# Patient Record
Sex: MALE | Race: WHITE | NOT HISPANIC OR LATINO | Employment: FULL TIME | URBAN - METROPOLITAN AREA
[De-identification: names, ages, dates, MRNs, and addresses within clinical notes are randomized per-mention and may not be internally consistent; named-entity substitution may affect disease eponyms.]

---

## 2018-07-18 ENCOUNTER — OFFICE VISIT (OUTPATIENT)
Dept: URGENT CARE | Facility: URGENT CARE | Age: 28
End: 2018-07-18
Payer: COMMERCIAL

## 2018-07-18 ENCOUNTER — RADIANT APPOINTMENT (OUTPATIENT)
Dept: GENERAL RADIOLOGY | Facility: CLINIC | Age: 28
End: 2018-07-18
Attending: PHYSICIAN ASSISTANT
Payer: COMMERCIAL

## 2018-07-18 VITALS
TEMPERATURE: 96.5 F | DIASTOLIC BLOOD PRESSURE: 93 MMHG | WEIGHT: 202 LBS | HEIGHT: 72 IN | BODY MASS INDEX: 27.36 KG/M2 | OXYGEN SATURATION: 97 % | SYSTOLIC BLOOD PRESSURE: 144 MMHG | HEART RATE: 102 BPM

## 2018-07-18 DIAGNOSIS — S56.419A RUPTURE OF EXTENSOR TENDON OF FINGER: Primary | ICD-10-CM

## 2018-07-18 PROCEDURE — 99213 OFFICE O/P EST LOW 20 MIN: CPT | Performed by: PHYSICIAN ASSISTANT

## 2018-07-18 PROCEDURE — 73140 X-RAY EXAM OF FINGER(S): CPT | Mod: LT

## 2018-07-18 NOTE — LETTER
July 18, 2018      Ion Dumont  [address] [place] [address]        To Whom It May Concern:    Ion Dumont  was seen on 07/18/18.  Please excuse his absence from work tomorrow, 7/19/19, due to injury. Further return to work restrictions to be provided by orthopedics.        Sincerely,        Erika Teague PA-C

## 2018-07-18 NOTE — MR AVS SNAPSHOT
After Visit Summary   7/18/2018    Ion Dumont    MRN: [id number]           Patient Information     Date Of Birth          1990        Visit Information        Provider Department      7/18/2018 8:30 PM Erika Teague PA-C [facility]        Today's Diagnoses     Rupture of extensor tendon of finger    -  1      Care Instructions    Your xrays showed no fractures or dislocation.    You seem to have ruptured the extensor tendon of your finger.    Please keep finger in splint at all times.    Follow up with orthopedics tomorrow- referral provided.            Follow-ups after your visit        Additional Services     ORTHO  REFERRAL       [facility] is referring you to the Orthopedic  Services at [facility].       The  Representative will assist you in the coordination of your Orthopedic and Musculoskeletal Care as prescribed by your physician.    The  Representative will call you within 1 business day to help schedule your appointment, or you may contact the  Representative at:    All areas ~ [phone]     Type of Referral : HAND    Needs to be seen tomorrow    Timeframe requested: 1 - 2 days    Coverage of these services is subject to the terms and limitations of your health insurance plan.  Please call member services at your health plan with any benefit or coverage questions.      If X-rays, CT or MRI's have been performed, please contact the facility where they were done to arrange for , prior to your scheduled appointment.  Please bring this referral request to your appointment and present it to your specialist.                  Follow-up notes from your care team     Return in about 1 day (around 7/19/2018) for ortho.      Who to contact     If you have questions or need follow up information about today's clinic visit or your schedule please contact [facility] directly at [phone].  Normal or non-critical lab and imaging results will be communicated to you by MyChart, letter or phone within 4 business days after the clinic has received the results. If you do not hear from us within 7 days, please contact the clinic through MyChart or phone. If you have a critical or abnormal lab result, we will notify you by phone as soon as possible.  Submit refill requests through [employer] or call your pharmacy and they will forward the refill request to us. Please allow 3 business days for your refill to be completed.          Additional Information About Your Visit        Care EveryWhere ID     This is your Care EveryWhere ID. This could be used by other organizations to access your [place] medical records  [id number]        Your Vitals Were     Pulse Temperature Height Pulse Oximetry BMI (Body Mass Index)       102 96.5  F (35.8  C) (Tympanic) 6' (1.829 m) 97% 27.4 kg/m2        Blood Pressure from Last 3 Encounters:   07/18/18 (!) 144/93    Weight from Last 3 Encounters:   07/18/18 202 lb (91.6 kg)              We Performed the Following     ORTHO  REFERRAL     XR Finger Left G/E 2 Views        Primary Care Provider Fax #    Physician No Ref-Primary [phone]       No address on file        Equal Access to Services     DYANA CHAVEZ : Hadii brandy ku hadasho Soomaali, waaxda luqadaha, qaybta kaalmada adeegyada, waxay alannain constantine mccray . So [facility] [phone].    ATENCIÓN: Si habla español, tiene a moran disposición servicios gratuitos de asistencia lingüística. Llame al [phone].    We comply with applicable federal civil rights laws and [place] laws. We do not discriminate on the basis of race, color, national origin, age, disability, sex, sexual orientation, or gender identity.            Thank you!     Thank you for choosing [facility]  for your care. Our goal is always to provide you with excellent  care. Hearing back from our patients is one way we can continue to improve our services. Please take a few minutes to complete the written survey that you may receive in the mail after your visit with us. Thank you!             Your Updated Medication List - Protect others around you: Learn how to safely use, store and throw away your medicines at www.disposemymeds.org.          This list is accurate as of 7/18/18  8:59 PM.  Always use your most recent med list.                   Brand Name Dispense Instructions for use Diagnosis    UNKNOWN TO PATIENT      Topical meds for  Skin condition.

## 2018-07-19 ENCOUNTER — TELEPHONE (OUTPATIENT)
Dept: ORTHOPEDICS | Facility: CLINIC | Age: 28
End: 2018-07-19

## 2018-07-19 ENCOUNTER — THERAPY VISIT (OUTPATIENT)
Dept: OCCUPATIONAL THERAPY | Facility: CLINIC | Age: 28
End: 2018-07-19
Payer: COMMERCIAL

## 2018-07-19 ENCOUNTER — OFFICE VISIT (OUTPATIENT)
Dept: ORTHOPEDICS | Facility: CLINIC | Age: 28
End: 2018-07-19
Payer: COMMERCIAL

## 2018-07-19 VITALS — HEIGHT: 72 IN | BODY MASS INDEX: 27.35 KG/M2 | WEIGHT: 201.94 LBS

## 2018-07-19 DIAGNOSIS — M20.012 MALLET DEFORMITY OF LEFT MIDDLE FINGER: Primary | ICD-10-CM

## 2018-07-19 DIAGNOSIS — M20.012 MALLET DEFORMITY OF LEFT MIDDLE FINGER: ICD-10-CM

## 2018-07-19 PROCEDURE — 97165 OT EVAL LOW COMPLEX 30 MIN: CPT | Mod: GO | Performed by: OCCUPATIONAL THERAPIST

## 2018-07-19 PROCEDURE — 97760 ORTHOTIC MGMT&TRAING 1ST ENC: CPT | Mod: GO | Performed by: OCCUPATIONAL THERAPIST

## 2018-07-19 NOTE — NURSING NOTE
"Reason For Visit:   Chief Complaint   Patient presents with     Consult     Left hand middle finger ruptured tendon.       Primary MD: No Ref-Primary, Physician  Ref. MD: Self    Age: 28 year old    ?  No      Ht 1.829 m (6' 0.01\")  Wt 91.6 kg (201 lb 15.1 oz)  BMI 27.38 kg/m2      Pain Assessment  Patient Currently in Pain: Denies               QuickDASH Assessment  No flowsheet data found.       Current Outpatient Prescriptions   Medication Sig Dispense Refill     UNKNOWN TO PATIENT Topical meds for  Skin condition.         No Known Allergies    Ramandeep Briseno, ATC    "

## 2018-07-19 NOTE — LETTER
Date:July 23, 2018      Patient was self referred, no letter generated. Do not send.        [facility] Health Information

## 2018-07-19 NOTE — LETTER
"7/19/2018       RE: Ion Dumont  [address]  [place] [address]     Dear Colleague,    Thank you for referring your patient, Ion Dumont, to the [facility] at [facility]. Please see a copy of my visit note below.    REFERRING PROVIDER: Erika Teague PA-C.    REASON FOR CONSULTATION: Left long finger injury.    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  Patient reports sustaining the injury last evening while playing kickball when he received a ball that jammed into his left long finger.  Following this, he did not experience significant pain, but was unable to extend the left long finger at his DIP joint.  He denies any numbness or tingling.  He has a significant history of orthopedic injuries all around his body.    MEDS: None.    ALLERGIES: None.    PMH: None.    PSH: R shoulder surgery, R femur ORIF. R scaphoid ORIF with bone graft.    SH:   Social History   Substance Use Topics     Smoking status: Never Smoker     Smokeless tobacco: Never Used     Alcohol use Not on file   Works as a pharmacist.    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    ROS: Denies chest pain, shortness of breath, MI, CVA, diabetes, DVT, PE, and bleeding disorders.    PHYSICAL EXAMINATION: Ht 1.829 m (6' 0.01\")  Wt 91.6 kg (201 lb 15.1 oz)  BMI 27.38 kg/m2  General: In no acute distress.  On examination of the left long finger, and aluminum/foam splint is intact.  This was removed to reveal a left long finger with no swelling and no open injuries.  The DIP joint sat in a flexed position.  Sensation to light touch was intact in both radial and ulnar borders of this finger.  There was good cap refill at the tip of the finger.  Patient was able to flex both PIP and DIP joints of the finger.  He was also able to extend the PIP joint.  However, he was unable to " extend the DIP joint actively.  Passively, the DIP joint could be extended to hyperextension.    IMAGING: X-ray of the left long finger revealed no bony injury.    ASSESSMENT: Left long finger closed mallet injury with no bony fragment.    PLAN: We discussed our options today.  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  Both options include uninterrupted 8 weeks of DIP joint immobilization in slight hyperextension.  The first option is to perform this with an external splint fabricated by hand therapy.  I explained to him that this may become a nuisance when he has to wash his hands.  The second option is to perform immobilization with 2 internal K wires spanning the DIP joint, which would then be removed surgically in 8 weeks.  This would have to be placed as an outpatient in the ambulatory surgery center.  I explained the risks to include skin damage, bleeding, infection, injury to surrounding structures, persistent extensor lag, and need for revision.  Patient carefully weight is options and elected to undergo external splinting.  I ordered a hand therapy referral for this.  I will see the patient back in 8 weeks.    Total time spent with patient was 30 min of which greater than 50% was in counseling.    Again, thank you for allowing me to participate in the care of your patient.      Sincerely,    Darwin Bland MD

## 2018-07-19 NOTE — TELEPHONE ENCOUNTER
[facility] Call Center    Phone Message    May a detailed message be left on voicemail: yes    Reason for Call: Other: Pt requesting call back to discuss an appt w/ a hand surgeon for today. Pt was seen at a  urgent care last night for a ruptured tendon in his middle finger     Action Taken: Message routed to:  [facility] ortho clinic

## 2018-07-19 NOTE — PROGRESS NOTES
"SUBJECTIVE:   Ion Dumont is a 28 year old male presenting for evaluation of   Chief Complaint   Patient presents with     Urgent Care     Pt in clinic to have eval for left hand middle finger injury.     Finger     Patient was playing kickball this evening. He reached out to try to catch the ball and the ball instead hit the tip of his left middle finger. He experienced immediate pain and thinks his finger is dislocated at the PIP joint. He is unable to bend at the PIP joint. He admits that the whole finger \"feels numb.\" No prior finger injuries or surgeries.    ROS  See HPI otherwise negative    PMH:  No past medical history on file.  There are no active problems to display for this patient.        Current medications:  Current Outpatient Prescriptions   Medication Sig Dispense Refill     UNKNOWN TO PATIENT Topical meds for  Skin condition.         Surgical History:  No past surgical history on file.    Family history:  No family history on file.      Social History:  Social History   Substance Use Topics     Smoking status: Never Smoker     Smokeless tobacco: Never Used     Alcohol use Not on file           OBJECTIVE  BP (!) 144/93  Pulse 102  Temp 96.5  F (35.8  C) (Tympanic)  Ht 6' (1.829 m)  Wt 202 lb (91.6 kg)  SpO2 97%  BMI 27.4 kg/m2    Physical Exam  General: well-appearing, no acute distress.   Muscloloskeletal: there is gross deformity of the left middle finger at the DIP joint- appears like a mallet finger. Patient unable to actively extend at DIP joint. Flexion at DIP preserved. Extension at PIP and MCP preserved.        Labs:  No results found for this or any previous visit (from the past 24 hour(s)).    X-Ray was done, my findings are: no fracture or dislocation    ASSESSMENT/PLAN:      ICD-10-CM    1. Rupture of extensor tendon of finger S56.419A XR Finger Left G/E 2 Views     ORTHO  REFERRAL      Patient with obvious deformity of the left middle finger. Ddx includes distal or " middle phalanx dislocation, extensor tendon rupture.   Upon arrival, I examined him and instilled a digital block to the middle finger with Lidocaine 2% without epinephrine.  Good anesthesia was achieved.    Xrays ordered to evaluate for fracture/dislocation. None were seen- I did not even appreciate a tiny avulsion fx to suggest avulsion of extensor tendonr. Radiology read is pending.    Regardless, clinically he has a mallet finger. We will treat as such. Placed patient in finger splint with DIP, PIP immobilized in extension.    Follow up with ortho tomorrow. STAT referral placed.      Patient Instructions   Your xrays showed no fractures or dislocation.    You seem to have ruptured the extensor tendon of your finger.    Please keep finger in splint at all times.    Follow up with orthopedics tomorrow- referral provided.              Erika Teague PA-C  07/18/18 9:15 PM

## 2018-07-19 NOTE — PROGRESS NOTES
"REFERRING PROVIDER: Erika Teague PA-C.    REASON FOR CONSULTATION: Left long finger injury.    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  Patient reports sustaining the injury last evening while playing kickball when he received a ball that jammed into his left long finger.  Following this, he did not experience significant pain, but was unable to extend the left long finger at his DIP joint.  He denies any numbness or tingling.  He has a significant history of orthopedic injuries all around his body.    MEDS: None.    ALLERGIES: None.    PMH: None.    PSH: R shoulder surgery, R femur ORIF. R scaphoid ORIF with bone graft.    SH:   Social History   Substance Use Topics     Smoking status: Never Smoker     Smokeless tobacco: Never Used     Alcohol use Not on file   Works as a pharmacist.    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    ROS: Denies chest pain, shortness of breath, MI, CVA, diabetes, DVT, PE, and bleeding disorders.    PHYSICAL EXAMINATION: Ht 1.829 m (6' 0.01\")  Wt 91.6 kg (201 lb 15.1 oz)  BMI 27.38 kg/m2  General: In no acute distress.  On examination of the left long finger, and aluminum/foam splint is intact.  This was removed to reveal a left long finger with no swelling and no open injuries.  The DIP joint sat in a flexed position.  Sensation to light touch was intact in both radial and ulnar borders of this finger.  There was good cap refill at the tip of the finger.  Patient was able to flex both PIP and DIP joints of the finger.  He was also able to extend the PIP joint.  However, he was unable to extend the DIP joint actively.  Passively, the DIP joint could be extended to hyperextension.    IMAGING: X-ray of the left long finger revealed no bony injury.    ASSESSMENT: Left long finger closed mallet injury with no bony fragment.    PLAN: We discussed our options today.  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  Both options include uninterrupted 8 weeks of DIP joint immobilization in slight hyperextension.  The first option is to perform this with an external splint fabricated by hand therapy.  I explained to him that this may become a nuisance when he has to wash his hands.  The second option is to perform immobilization with 2 internal K wires spanning the DIP joint, which would then be removed surgically in 8 weeks.  This would have to be placed as an outpatient in the ambulatory surgery center.  I explained the risks to include skin damage, bleeding, infection, injury to surrounding structures, persistent extensor lag, and need for revision.  Patient carefully weight is options and elected to undergo external splinting.  I ordered a hand therapy referral for this.  I will see the patient back in 8 weeks.    Total time spent with patient was 30 min of which greater than 50% was in counseling.

## 2018-07-19 NOTE — MR AVS SNAPSHOT
"              After Visit Summary   7/19/2018    Ion Dumont    MRN: [id number]           Patient Information     Date Of Birth          1990        Visit Information        Provider Department      7/19/2018 6:00 PM [facility] Bland [facility]        Today's Diagnoses     Mallet deformity of left middle finger           Follow-ups after your visit        Additional Services     HAND THERAPY Occupational Therapy or Physical Therapy       Hand Therapy Referral for left long finger mallet finger splint x 8 weeks.                  Follow-up notes from your care team     Return in about 8 weeks (around 9/13/2018).      Your next 10 appointments already scheduled     Jul 27, 2018  8:00 AM CDT   DEA Hand with Ella Russo OT   [facility] ([facility] [place])    [address] [place] [address] [id number] [phone]            Sep 13, 2018  4:15 PM CDT   (Arrive by 4:00 PM)   RETURN HAND with Darwin Bland MD   [facility] ([facility] [place])    [address] [place] [address] [id number] [phone]              Who to contact     Please call your clinic at [phone] to:    Ask questions about your health    Make or cancel appointments    Discuss your medicines    Learn about your test results    Speak to your doctor            Additional Information About Your Visit        Care EveryWhere ID     This is your Care EveryWhere ID. This could be used by other organizations to access your [place] medical records  [id number]        Your Vitals Were     Height BMI (Body Mass Index)                6' 0.01\" 27.38 kg/m2           Blood Pressure from Last 3 Encounters:   07/18/18 (!) 144/93    Weight from Last 3 Encounters:   07/19/18 201 lb 15.1 oz   07/18/18 202 lb              We Performed the Following     HAND THERAPY Occupational Therapy or Physical Therapy        Primary Care Provider Fax #    " Physician No Ref-Primary [phone]       No address on file        Equal Access to Services     DYANA MARDESIREE : Hadmaster aad ku hadchandukeren Cameliacristian, josueda calikrystinha, bernardclaudine choudanellenishi augustsarinishi, kalli moulton sawyertoshia tomasjudysimon watt. So [facility] [phone].    ATENCIÓN: Si habla español, tiene a moran disposición servicios gratuitos de asistencia lingüística. Llame al [phone].    We comply with applicable federal civil rights laws and [place] laws. We do not discriminate on the basis of race, color, national origin, age, disability, sex, sexual orientation, or gender identity.            Thank you!     Thank you for choosing [facility]  for your care. Our goal is always to provide you with excellent care. Hearing back from our patients is one way we can continue to improve our services. Please take a few minutes to complete the written survey that you may receive in the mail after your visit with us. Thank you!             Your Updated Medication List - Protect others around you: Learn how to safely use, store and throw away your medicines at www.disposemymeds.org.          This list is accurate as of 7/19/18 11:59 PM.  Always use your most recent med list.                   Brand Name Dispense Instructions for use Diagnosis    UNKNOWN TO PATIENT      Topical meds for  Skin condition.

## 2018-07-19 NOTE — PROGRESS NOTES
Hand Therapy Initial Evaluation    Current Date:  7/19/2018    Diagnosis: L long finger mallet injury  DOI: 07/18/18   Procedure:  none  Post:  1d    Precautions: NA    Subjective:  Ion Dmuont is a 28 year old left hand dominant male.    Patient reports symptoms of stiffness/loss of motion, weakness/loss of strength and edema of the left long finger which occurred due to playing kickball. Since onset symptoms are Unchanged  Special tests:  x-ray.  Previous treatment: splint in ED.    General health as reported by patient is excellent.  Pertinent medical history includes:None  Medical allergies:none.  Surgical history: none.  Medication history: None.    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  Job Tasks: Computer Work, Lifting, Carrying, Prolonged Sitting, Prolonged Standing, Pushing, Pulling, Repetitive Tasks  Prior functional level:  no limitations  Barriers include:none  Mobility: No difficulty  Transportation: drives  Leisure activities/hobbies: sports    Objective:  Pain Level Report  VAS(0-10) 7/19/2018   At Rest: 0/10   With Use: 0/10     Finger Edema  Circumference:  (Measured in cm)   7/19/2018   Location: Long finger   P1 6.5   PIP 7.0   P2 5.9   DIP 5.5     Sensation: WNL throughout all nerve distributions; per patient report    ROM:  Pain Report:  - none    + mild    ++ moderate    +++ severe     Long Finger 7/19/2018   AROM(PROM) left   MCP 0/98   PIP 0/90   DIP -40/81   CASH 229     Strength:  cont    Assessment:  Patient presents with symptoms consistent with diagnosis of mallet finger,  with conservative intervention.     Patient's limitations or Problem List includes:  Decreased ROM/motion, Increased edema and Weakness of the left long finger which interferes with the patient's ability to perform Self Care Tasks (dressing, eating, bathing, hygiene/toileting), Work Tasks, Sleep Patterns, Recreational Activities,  Household Chores and Driving  as compared to previous level of function.    Rehab Potential:  Excellent - Return to full activity, no limitations    Patient will benefit from skilled Occupational Therapy to increase stability of DIP joint and decrease edema to return to previous activity level and resume normal daily tasks and to reach their rehab potential.    Barriers to Learning:  No barrier    Communication Issues:  Patient appears to be able to clearly communicate and understand verbal and written communication and follow directions correctly.    Chart Review: Simple history review with patient    Identified Performance Deficits: bathing/showering, toileting, dressing, feeding, hygiene and grooming, driving and community mobility, health management and maintenance, home establishment and management, meal preparation and cleanup, work and leisure activities    Assessment of Occupational Performance:  5 or more Performance Deficits    Clinical Decision Making (Complexity): Low complexity    Treatment Explanation:  The following has been discussed with the patient:  RX ordered/plan of care  Anticipated outcomes  Possible risks and side effects    Plan:  Frequency:  1 X week, once daily  Duration:  for 2 weeks tapering to 2 X a month over 8 weeks    Treatment Plan:   Orthotic Fabrication:  Static orthosis  Discharge Plan:  Achieve all LTG.  Independent in home treatment program.  Reach maximal therapeutic benefit.    Home Exercise Program:  Mallet Finger orthosis, full time, only removed for hygiene and tip kept straight during that time    Next Visit:  Orthosis check  Edema mgmt

## 2018-07-19 NOTE — MR AVS SNAPSHOT
After Visit Summary   7/19/2018    Ion Dumont    MRN: [id number]           Patient Information     Date Of Birth          1990        Visit Information        Provider Department      7/19/2018 5:30 PM Cinthia Trevino OT [facility]        Today's Diagnoses     Mallet deformity of left middle finger    -  1       Follow-ups after your visit        Your next 10 appointments already scheduled     Jul 27, 2018  8:00 AM CDT   DEA Hand with Ella Russo OT   [facility] ([facility])    [address] [place] [id number] [phone]            Sep 13, 2018  4:15 PM CDT   (Arrive by 4:00 PM)   RETURN HAND with Darwin Bland MD   [facility] ([facility])    [address] [place] [address] [id number] [phone]              Who to contact     If you have questions or need follow up information about today's clinic visit or your schedule please contact [facility] HAND THERAP[facility] directly at [phone].  Normal or non-critical lab and imaging results will be communicated to you by MyChart, letter or phone within 4 business days after the clinic has received the results. If you do not hear from us within 7 days, please contact the clinic through MyChart or phone. If you have a critical or abnormal lab result, we will notify you by phone as soon as possible.  Submit refill requests through [employer] or call your pharmacy and they will forward the refill request to us. Please allow 3 business days for your refill to be completed.          Additional Information About Your Visit        Care EveryWhere ID     This is your Care EveryWhere ID. This could be used by other organizations to access your [place] medical records  [id number]         Blood Pressure from Last 3 Encounters:   07/18/18 (!) 144/93    Weight from Last 3 Encounters:   07/19/18 91.6 kg (201 lb 15.1 oz)    07/18/18 91.6 kg (202 lb)              We Performed the Following     HC OT EVAL, LOW COMPLEXITY     ORTHOTIC MGMT AND TRAINING, EACH 15 MIN        Primary Care Provider Fax #    Physician No Ref-Primary [phone]       No address on file        Equal Access to Services     DYANA KATHY : Johnny brandy whittaker javi Socristian, wafalgunida luqadaha, qaarturta kaalmada skinnymata, waxhazel kenney sawyertoshia babbelin halesimon mccray . So [facility] [phone].    ATENCIÓN: Si habla español, tiene a moran disposición servicios gratuitos de asistencia lingüística. Llame al [phone].    We comply with applicable federal civil rights laws and [place] laws. We do not discriminate on the basis of race, color, national origin, age, disability, sex, sexual orientation, or gender identity.            Thank you!     Thank you for choosing [facility] HAND THERAP[facility]  for your care. Our goal is always to provide you with excellent care. Hearing back from our patients is one way we can continue to improve our services. Please take a few minutes to complete the written survey that you may receive in the mail after your visit with us. Thank you!             Your Updated Medication List - Protect others around you: Learn how to safely use, store and throw away your medicines at www.disposemymeds.org.          This list is accurate as of 7/19/18  9:01 PM.  Always use your most recent med list.                   Brand Name Dispense Instructions for use Diagnosis    UNKNOWN TO PATIENT      Topical meds for  Skin condition.

## 2018-07-19 NOTE — PATIENT INSTRUCTIONS
Your xrays showed no fractures or dislocation.    You seem to have ruptured the extensor tendon of your finger.    Please keep finger in splint at all times.    Follow up with orthopedics tomorrow- referral provided.

## 2018-07-27 ENCOUNTER — THERAPY VISIT (OUTPATIENT)
Dept: OCCUPATIONAL THERAPY | Facility: CLINIC | Age: 28
End: 2018-07-27
Payer: COMMERCIAL

## 2018-07-27 DIAGNOSIS — M20.012 MALLET DEFORMITY OF LEFT MIDDLE FINGER: ICD-10-CM

## 2018-07-27 PROCEDURE — 97535 SELF CARE MNGMENT TRAINING: CPT | Mod: GO | Performed by: OCCUPATIONAL THERAPIST

## 2018-07-27 PROCEDURE — 97763 ORTHC/PROSTC MGMT SBSQ ENC: CPT | Mod: GO | Performed by: OCCUPATIONAL THERAPIST

## 2018-08-08 ENCOUNTER — THERAPY VISIT (OUTPATIENT)
Dept: OCCUPATIONAL THERAPY | Facility: CLINIC | Age: 28
End: 2018-08-08
Payer: COMMERCIAL

## 2018-08-08 DIAGNOSIS — M20.012 MALLET DEFORMITY OF LEFT MIDDLE FINGER: ICD-10-CM

## 2018-08-08 PROCEDURE — 97535 SELF CARE MNGMENT TRAINING: CPT | Mod: GO | Performed by: OCCUPATIONAL THERAPIST

## 2018-09-13 ENCOUNTER — OFFICE VISIT (OUTPATIENT)
Dept: ORTHOPEDICS | Facility: CLINIC | Age: 28
End: 2018-09-13
Payer: COMMERCIAL

## 2018-09-13 ENCOUNTER — APPOINTMENT (OUTPATIENT)
Dept: OCCUPATIONAL THERAPY | Facility: CLINIC | Age: 28
End: 2018-09-13
Payer: COMMERCIAL

## 2018-09-13 DIAGNOSIS — Z53.9 ERRONEOUS ENCOUNTER--DISREGARD: Primary | ICD-10-CM

## 2018-09-13 NOTE — MR AVS SNAPSHOT
After Visit Summary   9/13/2018    Ion Dumont    MRN: [id number]           Patient Information     Date Of Birth          1990        Visit Information        Provider Department      9/13/2018 4:15 PM Darwin Bland MD [facility]        Today's Diagnoses     ERRONEOUS ENCOUNTER--DISREGARD    -  1       Follow-ups after your visit        Who to contact     Please call your clinic at [phone] to:    Ask questions about your health    Make or cancel appointments    Discuss your medicines    Learn about your test results    Speak to your doctor            Additional Information About Your Visit        Care EveryWhere ID     This is your Care EveryWhere ID. This could be used by other organizations to access your [place] medical records  [id number]         Blood Pressure from Last 3 Encounters:   07/18/18 (!) 144/93    Weight from Last 3 Encounters:   07/19/18 91.6 kg (201 lb 15.1 oz)   07/18/18 91.6 kg (202 lb)              Today, you had the following     No orders found for display       Primary Care Provider Fax #    Physician No Ref-Primary [phone]       No address on file        Equal Access to Services     [facility]: Hadii brandy ku hadasho Soomaali, waaxda luqadaha, qaybta kaalmada adeegyada, kalli mccray . So [facility] [phone].    ATENCIÓN: Si habla español, tiene a moran disposición servicios gratuitos de asistencia lingüística. Llame al [phone].    We comply with applicable federal civil rights laws and [place] laws. We do not discriminate on the basis of race, color, national origin, age, disability, sex, sexual orientation, or gender identity.            Thank you!     Thank you for choosing [facility]  for your care. Our goal is always to provide you with excellent care. Hearing back from our patients is one way we can continue to improve our services. Please take a few minutes to complete the written  survey that you may receive in the mail after your visit with us. Thank you!             Your Updated Medication List - Protect others around you: Learn how to safely use, store and throw away your medicines at www.disposemymeds.org.          This list is accurate as of 9/13/18  5:48 PM.  Always use your most recent med list.                   Brand Name Dispense Instructions for use Diagnosis    UNKNOWN TO PATIENT      Topical meds for  Skin condition.

## 2018-09-13 NOTE — NURSING NOTE
Reason For Visit:   Chief Complaint   Patient presents with     RECHECK     8 week follow up left hand middle finger, mallet finger.       Primary MD: No Ref-Primary, Physician  Ref. MD: Self    Age: 28 year old    ?  No      There were no vitals taken for this visit.      Pain Assessment  Patient Currently in Pain: Denies               QuickDASH Assessment  No flowsheet data found.       Current Outpatient Prescriptions   Medication Sig Dispense Refill     UNKNOWN TO PATIENT Topical meds for  Skin condition.         No Known Allergies    Ramandeep Briseno, ATC

## 2018-09-20 ENCOUNTER — TELEPHONE (OUTPATIENT)
Dept: ORTHOPEDICS | Facility: CLINIC | Age: 28
End: 2018-09-20

## 2018-09-20 ENCOUNTER — THERAPY VISIT (OUTPATIENT)
Dept: OCCUPATIONAL THERAPY | Facility: CLINIC | Age: 28
End: 2018-09-20
Payer: COMMERCIAL

## 2018-09-20 ENCOUNTER — OFFICE VISIT (OUTPATIENT)
Dept: ORTHOPEDICS | Facility: CLINIC | Age: 28
End: 2018-09-20
Payer: COMMERCIAL

## 2018-09-20 DIAGNOSIS — M20.012 MALLET DEFORMITY OF LEFT MIDDLE FINGER: Primary | ICD-10-CM

## 2018-09-20 DIAGNOSIS — M20.012 MALLET DEFORMITY OF LEFT MIDDLE FINGER: ICD-10-CM

## 2018-09-20 PROCEDURE — 97110 THERAPEUTIC EXERCISES: CPT | Mod: GO | Performed by: OCCUPATIONAL THERAPIST

## 2018-09-20 NOTE — TELEPHONE ENCOUNTER
Patient was called back and he was offered and accepted an earlier appointment with Dr. Bland today 9/20/2018 at 3:00pm

## 2018-09-20 NOTE — MR AVS SNAPSHOT
After Visit Summary   9/20/2018    Ion Dumont    MRN: [id number]           Patient Information     Date Of Birth          1990        Visit Information        Provider Department      9/20/2018 3:00 PM [facility]        Today's Diagnoses     Mallet deformity of left middle finger    -  1       Follow-ups after your visit        Additional Services     HAND THERAPY Occupational Therapy or Physical Therapy       Hand Therapy Referral for active and passive ROM exercises following 9 weeks of splinting for mallet finger (L LF). Please progress to strengthening 3 - 4 weeks later.                  Follow-up notes from your care team     Return in about 4 weeks (around 10/18/2018).      Your next 10 appointments already scheduled     Sep 21, 2018 10:00 AM CDT   DEA Hand with Ella Russo OT   [facility] ([facility])    [address] [place] [id number] [phone]            Oct 16, 2018  9:00 AM CDT   (Arrive by 8:45 AM)   RETURN HAND with Darwin Bland MD   [facility] ([facility])    [address] [place] [id number] [phone]              Future tests that were ordered for you today     Open Future Orders        Priority Expected Expires Ordered    HAND THERAPY Occupational Therapy or Physical Therapy Routine  9/20/2019 9/20/2018            Who to contact     Please call your clinic at [phone] to:    Ask questions about your health    Make or cancel appointments    Discuss your medicines    Learn about your test results    Speak to your doctor            Additional Information About Your Visit        Care EveryWhere ID     This is your Care EveryWhere ID. This could be used by other organizations to access your [place] medical records  [id number]         Blood Pressure from Last 3 Encounters:   07/18/18 (!) 144/93     Weight from Last 3 Encounters:   07/19/18 201 lb 15.1 oz   07/18/18 202 lb               Primary Care Provider Fax #    Physician No Ref-Primary [phone]       No address on file        Equal Access to Services     DYANA CHAVEZ : Johnny brandy whittaker shenao Socristian, wafalgunida luqadaha, bernardta kaalmada wilmer, kalli mcfaddenyojana watt. So [facility] [phone].    ATENCIÓN: Si habla español, tiene a moran disposición servicios gratuitos de asistencia lingüística. Llame al [phone].    We comply with applicable federal civil rights laws and [place] laws. We do not discriminate on the basis of race, color, national origin, age, disability, sex, sexual orientation, or gender identity.            Thank you!     Thank you for choosing [facility]  for your care. Our goal is always to provide you with excellent care. Hearing back from our patients is one way we can continue to improve our services. Please take a few minutes to complete the written survey that you may receive in the mail after your visit with us. Thank you!             Your Updated Medication List - Protect others around you: Learn how to safely use, store and throw away your medicines at www.disposemymeds.org.          This list is accurate as of 9/20/18  3:31 PM.  Always use your most recent med list.                   Brand Name Dispense Instructions for use Diagnosis    UNKNOWN TO PATIENT      Topical meds for  Skin condition.

## 2018-09-20 NOTE — NURSING NOTE
Reason For Visit:   Chief Complaint   Patient presents with     RECHECK     8 week follow up left hand middle finger tendon reuptre, mallet finger       Primary MD: No Ref-Primary, Physician  Ref. MD: Self    Age: 28 year old    ?  No      There were no vitals taken for this visit.      Pain Assessment  Patient Currently in Pain: Denies               QuickDASH Assessment  No flowsheet data found.       Current Outpatient Prescriptions   Medication Sig Dispense Refill     UNKNOWN TO PATIENT Topical meds for  Skin condition.         No Known Allergies    Ramandeep Briseno, ATC

## 2018-09-20 NOTE — TELEPHONE ENCOUNTER
[facility] Call Center    Phone Message    May a detailed message be left on voicemail: yes    Reason for Call: Other: Pt wants to speak to a nurse about upcming appt today.      Action Taken: Message routed to:  [facility] Orthopedics

## 2018-09-20 NOTE — LETTER
Date:September 21, 2018      Patient was self referred, no letter generated. Do not send.        [facility] Health Information

## 2018-09-20 NOTE — PROGRESS NOTES
Patient returns for a follow-up visit regarding left long finger mallet injury.    INTERVAL HISTORY: Patient has been compliant with splinting for the past 9 weeks.    PHYSICAL EXAMINATION:  Gen.: No acute distress.  On exam of the left long finger, splint was removed. This revealed a fully extended DIP joint. Skin is somewhat erythematous and slightly macerated but there are no wounds. Patient is able to flicker actively flexion of the DIP joint in full extension of the DIP joint as well. Sensation to light touch is intact in the tip.    ASSESSMENT: 9 weeks splinting of left long finger DIP joint for mallet injury.    PLAN: Patient may come out of splint now. He is to transition to nighttime splinting only. He is to see hand therapy for active range of motion and passive range of motion. He is to wait on strengthening and strenuous activities at this point. I will see him back in 4 weeks.    Total time spent with patient was 15 min of which greater than 50% was in counseling.    Answers for HPI/ROS submitted by the patient on 9/20/2018   PHQ-2 Score: 0

## 2018-09-20 NOTE — TELEPHONE ENCOUNTER
Called patient to reschedule appointment due to Provider canceling clinic after 5:15 PM. Offered next available after speaking to nurse and inform patient that he can be schedule next week and if anything the clinic will give him a call if he needs to be seen sooner.  Patient decline. Per patient, give clinic number to speak to nurse Ramandeep.

## 2018-09-20 NOTE — MR AVS SNAPSHOT
After Visit Summary   9/20/2018    Ion Dumont    MRN: [id number]           Patient Information     Date Of Birth          1990        Visit Information        Provider Department      9/20/2018 3:30 PM [facility]        Today's Diagnoses     Mallet deformity of left middle finger           Follow-ups after your visit        Your next 10 appointments already scheduled     Sep 27, 2018  6:00 PM CDT   DEA Hand with Emma POLANCO [facility] ([facility] [place] [facility])    [address] [place] [id number] [phone]            Oct 16, 2018  9:00 AM CDT   (Arrive by 8:45 AM)   RETURN HAND with Darwin Bland MD   [facility] ([facility])    [address] [place] [id number] [phone]              Who to contact     If you have questions or need follow up information about today's clinic visit or your schedule please contact [facility] directly at [phone].  Normal or non-critical lab and imaging results will be communicated to you by MyChart, letter or phone within 4 business days after the clinic has received the results. If you do not hear from us within 7 days, please contact the clinic through MyChart or phone. If you have a critical or abnormal lab result, we will notify you by phone as soon as possible.  Submit refill requests through [employer] or call your pharmacy and they will forward the refill request to us. Please allow 3 business days for your refill to be completed.          Additional Information About Your Visit        Care EveryWhere ID     This is your Care EveryWhere ID. This could be used by other organizations to access your [place] medical records  [id number]         Blood Pressure from Last 3 Encounters:   07/18/18 (!) 144/93    Weight from Last 3 Encounters:   07/19/18 91.6 kg (201 lb 15.1 oz)   07/18/18 91.6 kg  (202 lb)              We Performed the Following     HAND THERAPY Occupational Therapy or Physical Therapy     THERAPEUTIC EXERCISES        Primary Care Provider Fax #    Physician No Ref-Primary [phone]       No address on file        Equal Access to Services     DYANA CHAVEZ : Hadii aad ku hadchandukeren Zavaleta, linden caliaxel, zina kajeremiah guillen, kalli fitch skinnyelin phillips laClydeyojana watt. So [facility] [phone].    ATENCIÓN: Si habla español, tiene a moran disposición servicios gratuitos de asistencia lingüística. Llame al [phone].    We comply with applicable federal civil rights laws and [place] laws. We do not discriminate on the basis of race, color, national origin, age, disability, sex, sexual orientation, or gender identity.            Thank you!     Thank you for choosing [facility] HAND THER[facility]  for your care. Our goal is always to provide you with excellent care. Hearing back from our patients is one way we can continue to improve our services. Please take a few minutes to complete the written survey that you may receive in the mail after your visit with us. Thank you!             Your Updated Medication List - Protect others around you: Learn how to safely use, store and throw away your medicines at www.disposemymeds.org.          This list is accurate as of 9/20/18 11:59 PM.  Always use your most recent med list.                   Brand Name Dispense Instructions for use Diagnosis    UNKNOWN TO PATIENT      Topical meds for  Skin condition.

## 2018-09-20 NOTE — LETTER
9/20/2018       RE: Ion Dumont  [address]  [place] [address]     Dear Colleague,    Thank you for referring your patient, Ion Dumont, to the [facility] at [facility]. Please see a copy of my visit note below.    Patient returns for a follow-up visit regarding left long finger mallet injury.    INTERVAL HISTORY: Patient has been compliant with splinting for the past 9 weeks.    PHYSICAL EXAMINATION:  Gen.: No acute distress.  On exam of the left long finger, splint was removed. This revealed a fully extended DIP joint. Skin is somewhat erythematous and slightly macerated but there are no wounds. Patient is able to flicker actively flexion of the DIP joint in full extension of the DIP joint as well. Sensation to light touch is intact in the tip.    ASSESSMENT: 9 weeks splinting of left long finger DIP joint for mallet injury.    PLAN: Patient may come out of splint now. He is to transition to nighttime splinting only. He is to see hand therapy for active range of motion and passive range of motion. He is to wait on strengthening and strenuous activities at this point. I will see him back in 4 weeks.    Total time spent with patient was 15 min of which greater than 50% was in counseling.    Answers for HPI/ROS submitted by the patient on 9/20/2018   PHQ-2 Score: 0      Again, thank you for allowing me to participate in the care of your patient.      Sincerely,    Darwin Bland MD

## 2018-09-27 ENCOUNTER — THERAPY VISIT (OUTPATIENT)
Dept: OCCUPATIONAL THERAPY | Facility: CLINIC | Age: 28
End: 2018-09-27
Payer: COMMERCIAL

## 2018-09-27 DIAGNOSIS — M20.012 MALLET DEFORMITY OF LEFT MIDDLE FINGER: ICD-10-CM

## 2018-09-27 PROCEDURE — 97110 THERAPEUTIC EXERCISES: CPT | Mod: GO | Performed by: OCCUPATIONAL THERAPIST

## 2018-09-27 PROCEDURE — 97763 ORTHC/PROSTC MGMT SBSQ ENC: CPT | Mod: GO | Performed by: OCCUPATIONAL THERAPIST

## 2018-09-27 NOTE — PROGRESS NOTES
Discharge Note - Hand Therapy    Current Date:  9/28/2018    Initial Evaluation Date:  7/19/2018  Reporting period is from 7/19/2018 to 9/28/2018  Number of Visits:  5    Diagnosis:  Left long mallet finger injury  DOI:  7/18/2018  Post: 9 w 1d     Subjective:   Subjective changes as noted by patient:  Less stiffness of PIP and DIP joints.     Initial Pain Levels:  0/10 at rest;  0/10 with use  Current Pain Levels:  No change at rest;  No change with use  Functional changes noted by patient:  Improvement in Self Care Tasks (dressing, bathing), Work Tasks and Household Chores  Patient has noted adverse reaction to:  None        Objective:    Finger Edema:    Circumference:  (Measured in cm)   7/19/2018 9/27/2018   Location: Long finger Long finger   P1 6.5 6.5   PIP 7.0 6.7   P2 5.9 6.0   DIP 5.5 5.3     Sensation: WNL throughout all nerve distributions; per patient report    ROM:  Pain Report:  - none    + mild    ++ moderate    +++ severe     Long Finger 7/19/2018 9/20/2018 9/27/2018   AROM(PROM) left left left   MCP 0/98 0/95 0/90   PIP 0/90 0/80 0/90   DIP -40/81 0/25 0/65   CASH 229 200 245     Strength:  Not tested        Assessment:  Response to therapy has been improvement to:  ROM of Fingers: All Planes  Appropriateness of Rx I have re-evaluated this patient and find that the nature, scope, duration and intensity of the therapy is appropriate for the medical condition of the patient.  Overall Assessment:  Patient's symptoms are resolving.  Patient is progressing well.  Patient is independent in home exercise program.  Patient has met Short and Long Term Treatment Goals.  STG/LTG:  Patient met all stated goals. See goal sheet for details and updates.    Plan:  Frequency/Duration:  Discharge from Hand Therapy; continue home program.    Recommendations for Home Program - Therapeutic Exercise:  AROM, PROM, Tendon Gliding and Blocking  Orthotic Fabrication:  Mallet finger splint at nighttime for 3 additional  weeks.

## 2018-09-27 NOTE — MR AVS SNAPSHOT
After Visit Summary   9/27/2018    Ion Dumont    MRN: [id number]           Patient Information     Date Of Birth          1990        Visit Information        Provider Department      9/27/2018 6:00 PM Emma Phillips [facility]        Today's Diagnoses     Mallet deformity of left middle finger           Follow-ups after your visit        Your next 10 appointments already scheduled     Oct 16, 2018  9:00 AM CDT   (Arrive by 8:45 AM)   RETURN HAND with Darwin Bland MD   [facility] ([facility])    [address] [place] [id number] [phone]              Who to contact     If you have questions or need follow up information about today's clinic visit or your schedule please contact [facility] HAND THERAPY directly at [phone].  Normal or non-critical lab and imaging results will be communicated to you by MyChart, letter or phone within 4 business days after the clinic has received the results. If you do not hear from us within 7 days, please contact the clinic through MyChart or phone. If you have a critical or abnormal lab result, we will notify you by phone as soon as possible.  Submit refill requests through [employer] or call your pharmacy and they will forward the refill request to us. Please allow 3 business days for your refill to be completed.          Additional Information About Your Visit        Care EveryWhere ID     This is your Care EveryWhere ID. This could be used by other organizations to access your [place] medical records  [id number]         Blood Pressure from Last 3 Encounters:   07/18/18 (!) 144/93    Weight from Last 3 Encounters:   07/19/18 91.6 kg (201 lb 15.1 oz)   07/18/18 91.6 kg (202 lb)              We Performed the Following     C OT ORTHOTICS/PROSTH MGMT &/TRAING SBSQ ENCTR, EA 15 MIN     THERAPEUTIC EXERCISES        Primary Care Provider Fax #    Physician No Ref-Primary  [phone]       No address on file        Equal Access to Services     DYANA KATHY : Hadii aad ku hadchandukeren Rolandali, josuenishi leivakrystinha, bernardclaudine choudanellenishi augustsarinishi, waxhazel alannain hayaatoshia babbelin genovevajudysimon watt. So [facility] [phone].    ATENCIÓN: Si habla español, tiene a moran disposición servicios gratuitos de asistencia lingüística. Llame al [phone].    We comply with applicable federal civil rights laws and [place] laws. We do not discriminate on the basis of race, color, national origin, age, disability, sex, sexual orientation, or gender identity.            Thank you!     Thank you for choosing [facility] HAND THERAP[facility]  for your care. Our goal is always to provide you with excellent care. Hearing back from our patients is one way we can continue to improve our services. Please take a few minutes to complete the written survey that you may receive in the mail after your visit with us. Thank you!             Your Updated Medication List - Protect others around you: Learn how to safely use, store and throw away your medicines at www.disposemymeds.org.          This list is accurate as of 9/27/18 11:59 PM.  Always use your most recent med list.                   Brand Name Dispense Instructions for use Diagnosis    UNKNOWN TO PATIENT      Topical meds for  Skin condition.

## 2018-09-29 PROBLEM — M20.012 MALLET DEFORMITY OF LEFT MIDDLE FINGER: Status: RESOLVED | Noted: 2018-07-19 | Resolved: 2018-09-29

## 2018-10-16 ENCOUNTER — OFFICE VISIT (OUTPATIENT)
Dept: ORTHOPEDICS | Facility: CLINIC | Age: 28
End: 2018-10-16
Payer: COMMERCIAL

## 2018-10-16 VITALS — BODY MASS INDEX: 27.22 KG/M2 | WEIGHT: 201 LBS | HEIGHT: 72 IN

## 2018-10-16 DIAGNOSIS — M20.012 MALLET DEFORMITY OF LEFT MIDDLE FINGER: Primary | ICD-10-CM

## 2018-10-16 NOTE — MR AVS SNAPSHOT
After Visit Summary   10/16/2018    Ion Dumont    MRN: [id number]           Patient Information     Date Of Birth          1990        Visit Information        Provider Department      10/16/2018 9:00 AM Darwin Bland MD [facility]        Today's Diagnoses     Mallet deformity of left middle finger    -  1       Follow-ups after your visit        Follow-up notes from your care team     Return if symptoms worsen or fail to improve.      Who to contact     Please call your clinic at [phone] to:    Ask questions about your health    Make or cancel appointments    Discuss your medicines    Learn about your test results    Speak to your doctor            Additional Information About Your Visit        Care EveryWhere ID     This is your Care EveryWhere ID. This could be used by other organizations to access your [facility] medical records  [id number]        Your Vitals Were     Height BMI (Body Mass Index)                1.829 m (6') 27.26 kg/m2           Blood Pressure from Last 3 Encounters:   07/18/18 (!) 144/93    Weight from Last 3 Encounters:   10/16/18 91.2 kg (201 lb)   07/19/18 91.6 kg (201 lb 15.1 oz)   07/18/18 91.6 kg (202 lb)              Today, you had the following     No orders found for display       Primary Care Provider Fax #    Physician No Ref-Primary [phone]       No address on file        Equal Access to Services     DYANA CHAVEZ : Hadii brandy ku hadasho Soomaali, waaxda luqadaha, qaybta kaalmada adeegyada, waxay idiin constantine mccray . So [facility] [phone].    ATENCIÓN: Si habla español, tiene a morna disposición servicios gratpaulineos de asistencia lingüística. Llame al [phone].    We comply with applicable federal civil rights laws and [place] laws. We do not discriminate on the basis of race, color, national origin, age, disability, sex, sexual orientation, or gender identity.            Thank you!     Thank you for choosing [facility]  for your care. Our goal is always to provide you with excellent care. Hearing back from our patients is one way we can continue to improve our services. Please take a few minutes to complete the written survey that you may receive in the mail after your visit with us. Thank you!             Your Updated Medication List - Protect others around you: Learn how to safely use, store and throw away your medicines at www.disposemymeds.org.          This list is accurate as of 10/16/18 11:33 AM.  Always use your most recent med list.                   Brand Name Dispense Instructions for use Diagnosis    UNKNOWN TO PATIENT      Topical meds for  Skin condition.

## 2018-10-16 NOTE — NURSING NOTE
Reason For Visit:   Chief Complaint   Patient presents with     RECHECK     The patient is following  up today afera left middle finger mallet deformity.        Primary MD: No Ref-Primary, Physician  Ref. MD: self    Age: 28 year old    ?  No      Ht 1.829 m (6')  Wt 91.2 kg (201 lb)  BMI 27.26 kg/m2      Pain Assessment  Patient Currently in Pain: Denies    Hand Dominance Evaluation  Hand Dominance: Left          QuickDASH Assessment  No flowsheet data found.       Current Outpatient Prescriptions   Medication Sig Dispense Refill     UNKNOWN TO PATIENT Topical meds for  Skin condition.         No Known Allergies    Madhavi Carlton, ATC

## 2018-10-16 NOTE — LETTER
10/16/2018       RE: Ion Dumont  [address]  [place] [address]     Dear Colleague,    Thank you for referring your patient, Ion Dumont, [facility]. Please see a copy of my visit note below.    Patient returns for a follow-up visit regarding left long finger mallet injury.    INTERVAL HISTORY: Complains of minimal swelling. Otherwise is doing well.    PHYSICAL EXAMINATION:  Gen.: No acute distress.  On exam of the left long finger, there is minimal swelling of the distal phalanx and middle pharynx. Patient has active flexion of the DIP joint to 80 . There is a 5-10  extension lag. Sensation to light touch is intact in the fingertip.    ASSESSMENT: 3 months splinting of left long finger DIP joint for mallet injury.    PLAN: Patient may discontinue nighttime splinting. He has no activity restrictions from my standpoint. He is fully healed. He may follow up as needed.    Total time spent with patient was 15 min of which greater than 50% was in counseling.    Answers for HPI/ROS submitted by the patient on 9/20/2018   PHQ-2 Score: 0      Again, thank you for allowing me to participate in the care of your patient.      Sincerely,    Darwin Bland MD

## 2018-10-16 NOTE — PROGRESS NOTES
Patient returns for a follow-up visit regarding left long finger mallet injury.    INTERVAL HISTORY: Complains of minimal swelling. Otherwise is doing well.    PHYSICAL EXAMINATION:  Gen.: No acute distress.  On exam of the left long finger, there is minimal swelling of the distal phalanx and middle pharynx. Patient has active flexion of the DIP joint to 80 . There is a 5-10  extension lag. Sensation to light touch is intact in the fingertip.    ASSESSMENT: 3 months splinting of left long finger DIP joint for mallet injury.    PLAN: Patient may discontinue nighttime splinting. He has no activity restrictions from my standpoint. He is fully healed. He may follow up as needed.    Total time spent with patient was 15 min of which greater than 50% was in counseling.    Answers for HPI/ROS submitted by the patient on 9/20/2018   PHQ-2 Score: 0

## 2018-10-16 NOTE — LETTER
Date:October 17, 2018      Patient was self referred, no letter generated. Do not send.        [facility] Health Information

## 2018-11-21 NOTE — LETTER
9/13/2018       RE: Ion Dumont  [address]  [place] [address]     Dear Colleague,    Thank you for referring your patient, Ion Dumont, to the [facility] at [facility]. Please see a copy of my visit note below.      This encounter was opened in error. Please disregard.    Again, thank you for allowing me to participate in the care of your patient.      Sincerely,    Darwin Bland MD       Verified Results  XR HIP LT 2V W PELVIS 1V 24Jan2018 09:48AM KATHARINE ACOSTA   Ordering Provider: KATHARINE WARE.    Reason For Study: LT HIP PAIN,LT HIP PAIN.   [Jan 24, 2018 11:40AM KATHARINE ACOSTA]  E-message to patient - Spine arthritis, normal hip prosthesis, no fractures     Test Name Result Flag Reference   XR HIP LT 2V W PELVIS 1V (Report)     Accession #    [id number]    EXAM: XR HIP LT 2V W PELVIS 1V    CLINICAL INDICATION: Left hip pain, total hip replacement 12 years ago    COMPARISON: None.    FINDINGS: Three digital radiographs of the left hip are provided for review. Total hip   prosthesis is satisfactory in position and alignment. No dislocation of components. There   is no evidence for acetabular cup or femoral stem loosening. There is no evidence for device   component failure.    IMPRESSION:    Satisfactory appearance left total hip prosthesis.    **** F I N A L ****    Transcribed By: CECILIO   01/24/18 10:50 am    Dictated By:      DEMARCUS HILARIO    Electronically Reviewed and Approved By:      DEMARCUS HILARIO 01/24/18 11:20 am     XR SPINE LUMBAR 2V 24Jan2018 09:48AM KATHARINE ACOSTA   Ordering Provider: KATHARINE WARE.    Reason For Study: LT HIP PAIN,LT HIP PAIN.   [Jan 24, 2018 11:40AM KATHARINE ACOSTA]  E-message to patient - Spine arthritis, normal hip prosthesis, no fractures     Test Name Result Flag Reference   XR SPINE LUMBAR 2V (Report)     Accession #    [id number]    EXAM: XR SPINE LUMBAR 2V    CLINICAL INDICATION: Left hip pain    COMPARISON: None.    FINDINGS: Three digital radiographs lumbar spine provided for review. Vertebral bodies   are normal in height without evidence for spondylolisthesis. There is a mild degenerative   dextroscoliosis. Intervertebral disc spaces are well-maintained. Degenerative bridging   osteophytes at all levels suggest chronicity. No focal lytic or blastic lesions identified.    IMPRESSION:    Degenerative changes as detailed the body the  report.    **** F I N A L ****    Transcribed By: CECILIO   01/24/18 11:21 am    Dictated By:      DEMARCUS HILARIO    Electronically Reviewed and Approved By:      DEMARCUS HILARIO 01/24/18 11:22 am

## 2021-07-14 NOTE — PROGRESS NOTES
SOAP note objective information for 9/20/2018.  Please refer to the daily flowsheet for treatment today, total treatment time and time spent performing 1:1 timed codes.       Diagnosis: L long finger mallet injury  DOI: 07/18/18   Procedure:  none  Post:  9w 1d    9/20/18 MD orders: Wear splint overnight for 4 additional weeks. Initiate A/PROM and blocking exercises. Avoid heavy .      Objective:  Pain Level Report  VAS(0-10) 7/19/2018 9/20/2018   At Rest: 0/10 0/10   With Use: 0/10 1/10     Finger Edema  Circumference:  (Measured in cm)   7/19/2018   Location: Long finger   P1 6.5   PIP 7.0   P2 5.9   DIP 5.5     Sensation: WNL throughout all nerve distributions; per patient report    ROM:  Pain Report:  - none    + mild    ++ moderate    +++ severe     Long Finger 7/19/2018 9/20/2018   AROM(PROM) left left   MCP 0/98 0/95   PIP 0/90 0/80   DIP -40/81 0/25   CASH 229 200     Strength:  Not tested        Home Exercise Program:  Mallet Finger orthosis, nighttime for 4 additional weeks  Tendon glides  Blocking exercises  Avoid heavy girp    Next Visit:  Check ROM  Progress AROM, initiate PROM     14-Jul-2021 11:39

## 2022-01-20 ENCOUNTER — LAB REQUISITION (OUTPATIENT)
Dept: LAB | Facility: CLINIC | Age: 32
End: 2022-01-20

## 2022-01-20 PROCEDURE — U0005 INFEC AGEN DETEC AMPLI PROBE: HCPCS | Performed by: INTERNAL MEDICINE

## 2022-01-21 LAB — SARS-COV-2 RNA RESP QL NAA+PROBE: NEGATIVE

## 2022-02-12 ENCOUNTER — HEALTH MAINTENANCE LETTER (OUTPATIENT)
Age: 32
End: 2022-02-12

## 2022-06-16 ENCOUNTER — LAB (OUTPATIENT)
Dept: LAB | Facility: CLINIC | Age: 32
End: 2022-06-16

## 2022-06-16 ENCOUNTER — OFFICE VISIT (OUTPATIENT)
Dept: INTERNAL MEDICINE | Facility: CLINIC | Age: 32
End: 2022-06-16
Payer: COMMERCIAL

## 2022-06-16 VITALS
BODY MASS INDEX: 28.35 KG/M2 | SYSTOLIC BLOOD PRESSURE: 128 MMHG | TEMPERATURE: 97.8 F | OXYGEN SATURATION: 99 % | WEIGHT: 213.9 LBS | DIASTOLIC BLOOD PRESSURE: 91 MMHG | HEART RATE: 92 BPM | HEIGHT: 73 IN

## 2022-06-16 DIAGNOSIS — Z00.00 ROUTINE GENERAL MEDICAL EXAMINATION AT A HEALTH CARE FACILITY: ICD-10-CM

## 2022-06-16 DIAGNOSIS — Z00.00 ROUTINE GENERAL MEDICAL EXAMINATION AT A HEALTH CARE FACILITY: Primary | ICD-10-CM

## 2022-06-16 LAB
CHOLEST SERPL-MCNC: 159 MG/DL
FASTING STATUS PATIENT QL REPORTED: NO
HCV AB SERPL QL IA: NONREACTIVE
HDLC SERPL-MCNC: 41 MG/DL
HIV 1+2 AB+HIV1 P24 AG SERPL QL IA: NONREACTIVE
LDLC SERPL CALC-MCNC: 104 MG/DL
NONHDLC SERPL-MCNC: 118 MG/DL
TRIGL SERPL-MCNC: 68 MG/DL

## 2022-06-16 PROCEDURE — 36415 COLL VENOUS BLD VENIPUNCTURE: CPT | Performed by: PATHOLOGY

## 2022-06-16 PROCEDURE — 90715 TDAP VACCINE 7 YRS/> IM: CPT | Performed by: STUDENT IN AN ORGANIZED HEALTH CARE EDUCATION/TRAINING PROGRAM

## 2022-06-16 PROCEDURE — 80061 LIPID PANEL: CPT | Performed by: PATHOLOGY

## 2022-06-16 PROCEDURE — 87389 HIV-1 AG W/HIV-1&-2 AB AG IA: CPT | Mod: 90 | Performed by: PATHOLOGY

## 2022-06-16 PROCEDURE — 99385 PREV VISIT NEW AGE 18-39: CPT | Mod: 25 | Performed by: STUDENT IN AN ORGANIZED HEALTH CARE EDUCATION/TRAINING PROGRAM

## 2022-06-16 PROCEDURE — 99000 SPECIMEN HANDLING OFFICE-LAB: CPT | Performed by: PATHOLOGY

## 2022-06-16 PROCEDURE — 90471 IMMUNIZATION ADMIN: CPT | Performed by: STUDENT IN AN ORGANIZED HEALTH CARE EDUCATION/TRAINING PROGRAM

## 2022-06-16 PROCEDURE — 86803 HEPATITIS C AB TEST: CPT | Mod: 90 | Performed by: PATHOLOGY

## 2022-06-16 RX ORDER — BIOTIN 10 MG
TABLET ORAL
COMMUNITY

## 2022-06-16 RX ORDER — ADAPALENE AND BENZOYL PEROXIDE GEL, 0.1%/2.5% 1; 25 MG/G; MG/G
GEL TOPICAL
COMMUNITY
Start: 2014-10-06 | End: 2023-11-21

## 2022-06-16 ASSESSMENT — PAIN SCALES - GENERAL: PAINLEVEL: NO PAIN (0)

## 2022-06-16 NOTE — PROGRESS NOTES
"lipiCC: establish care      HPI:  32M with no significant PMH presents today to establish care. He has no significant PMH and is healthy, takes no medications. Family h/o significant for colon cancer in father at age 50. He himself had colonoscopy at 24 which was normal. Trying to eat home cooked meals. No specific exercise regimen, mostly walks. Nonsmoker. occasional ETOH.    ROS:  10pt ROS reviewed and positive per HPI.     PMH: I have personally reviewed the patient's medical history, medications, and allergies.    Family   Dad - diagnosed with stage 3 colon cancer at age 49yo, hairy cell leukemia    BP (!) 128/91 (BP Location: Right arm, Patient Position: Sitting)   Pulse 92   Temp 97.8  F (36.6  C) (Oral)   Ht 1.86 m (6' 1.23\")   Wt 97 kg (213 lb 14.4 oz)   SpO2 99%   BMI 28.05 kg/m    Physical Examination:    General:  Alert, NAD  HEENT: NC/AT. No significant LAD appreciate in neck. Thyroid smooth without discrete nodules. EOM grossly intact.  Lungs:  CTAB.   C/V:  RRR with no m/r/g.    Neuro: Alert and conversant, face symmetric. No gross neurologic deficits.  Skin:   warm, dry, no rashes on exposed skin surfaces  Extremities:  No peripheral edema  Psych:  Alert and oriented. Appropriate affect.      A&P:  RHM  -due for tdap  -lipid, HIV, hep c screening    Family h/o colon cancer  -had colonoscopy at 24, would benefit from routine colonoscopy at least at age 40yo or 33yo    Tracy Marcos MD  IM Resident PGY-2    This patient was discussed and seen with Dr. Salinas    "

## 2022-10-09 ENCOUNTER — HEALTH MAINTENANCE LETTER (OUTPATIENT)
Age: 32
End: 2022-10-09

## 2023-08-19 ENCOUNTER — HEALTH MAINTENANCE LETTER (OUTPATIENT)
Age: 33
End: 2023-08-19

## 2023-11-21 ENCOUNTER — LAB (OUTPATIENT)
Dept: LAB | Facility: CLINIC | Age: 33
End: 2023-11-21
Payer: COMMERCIAL

## 2023-11-21 ENCOUNTER — OFFICE VISIT (OUTPATIENT)
Dept: INTERNAL MEDICINE | Facility: CLINIC | Age: 33
End: 2023-11-21
Payer: COMMERCIAL

## 2023-11-21 VITALS
DIASTOLIC BLOOD PRESSURE: 87 MMHG | HEART RATE: 75 BPM | WEIGHT: 204.8 LBS | HEIGHT: 73 IN | BODY MASS INDEX: 27.14 KG/M2 | OXYGEN SATURATION: 97 % | SYSTOLIC BLOOD PRESSURE: 133 MMHG

## 2023-11-21 DIAGNOSIS — Z00.00 HEALTHCARE MAINTENANCE: Primary | ICD-10-CM

## 2023-11-21 DIAGNOSIS — Z00.00 HEALTHCARE MAINTENANCE: ICD-10-CM

## 2023-11-21 DIAGNOSIS — L70.0 ACNE VULGARIS: ICD-10-CM

## 2023-11-21 PROCEDURE — 99395 PREV VISIT EST AGE 18-39: CPT | Mod: GE

## 2023-11-21 RX ORDER — ADAPALENE AND BENZOYL PEROXIDE GEL, 0.1%/2.5% 1; 25 MG/G; MG/G
GEL TOPICAL DAILY
Qty: 45 G | Refills: 0 | Status: SHIPPED | OUTPATIENT
Start: 2023-11-21

## 2023-11-21 NOTE — PROGRESS NOTES
"  PRIMARY CARE CENTER         HPI:      Ion Dumont is a 33 year old male who presents for annual exam. No significant PMH.     He had many ear infections as a child and has mild hearing loss. He has 2 young children and was sick a few weeks ago. His sxs resolved.     Previously did acutane. Was prescribed adapalene-benzoyl peroxide. He wears sunscreen. Was seen previously by Derm in 2021. He will schedule another skin check.     Otherwise feeling well.     Not exercising very much. Diet has improved. Eating fruits and veggies. They cook a lot of meals at home.     Denies fatigue,  fevers, chills, CP, SOB, abd pain, N/V/D and BLE edema        Social History:  Occupation: oncology pharmacist  Lives: in [facility]  Smoking: never  Other rec/illicit drug use: marijuana a few times in college  Sexual activity:  to female, sexually active, no STIs in past  Alcohol: less than 1x/month    Fhx:  - Father was diagnosed with colon cancer at age 50, stage 3. He was having abdominal issues ,GI sickness, some blood in stools, and underwent a colonoscopy at age 25. He had the colonoscopy at [facility].   -paternal GM had pancreatic ca in 90s  -Maternal GF had a stroke around 60s?           Review of systems:     See HPI for pertinent ROS         Physical Exam:   /87 (BP Location: Right arm, Patient Position: Sitting, Cuff Size: Adult Regular)   Pulse 75   Ht 1.86 m (6' 1.23\")   Wt 92.9 kg (204 lb 12.8 oz)   SpO2 97%   BMI 26.85 kg/m    Body mass index is 26.85 kg/m .  Vitals were reviewed    General: Sitting upright, talking in complete sentences, non-distressed, well-groomed  HEENT: Normocephalic, atraumatic, PERRL, EOMI, no conjunctival pallor, anicteric, oropharynx clear and moist, TMs clear bilaterally  CVS: S1/S2 WNL, RRR, no murmur, no LE edema  Pulm: Non-labored breathing, clear to auscultation b/l, no crackles or wheeze  Abdo: Non-distended, non-tender to palpation, no rebound or guarding, BS present "   MSK: No obvious bony deformities, no clubbing  Skin: Warm and dry, no obvious rashes  Neuro: Alert and oriented x3, non-focal, moves all extremities spontaneously  Psych: Normal mood and affect   LAD: No anterior or posterior cervical or supraclavicular lymphadenopathy           Assessment and Plan     Ion was seen today for physical.    Healthcare maintenance  -Colonoscopy: Father diagnosed with colon cancer age 50.  Discussed with patient he needs colonoscopy at 39.  In the interim, we will do FIT testing annually as additional screening due to his father being diagnosed with stage III colon cancer  -Mammogram: n/a  -Pap/HPV: n/a  -STI/HIV: nonreactive in 2022  -HCV: negative in 2022  -DM: none, weight controlled.   -HTN: none, /87. Will monitor given .  Recommended he get a BP cuff and monitor his pressure 3 times weekly  -Lipids: normal lipid panel in 2022  -Smoking/AAA: n/a  -EtOH: occasional, discussed moderation  -DEXA: n/a  -Immunizations: up to date    - Orded FIT testing per above      #Acne vulgaris   -He uses adapalene-benzyl peroxide occasionally.  Refilled this for him.  Recommended daily sunscreen      Options for treatment and follow-up care were reviewed with the patient. Ion Dumont engaged in the decision making process and verbalized understanding of the options discussed and agreed with the final plan.        Pt was seen and plan of care discussed with Dr. Aristeo Allen MD.    Cat Saldaña MD  Internal Medicine-PGY3  [facility]  Pager: [phone]  Nov 21, 2023  While the patient was in clinic, I reviewed the pertinent medical history and results.  I discussed the current findings on physical examination, as well as the patient s diagnosis and treatment plan with the resident and agree with the information as documented with the following exceptions: none.  Aristeo Allen MD

## 2023-11-21 NOTE — PROGRESS NOTES
Ion is a 33 year old that presents in clinic today for the following:     Chief Complaint   Patient presents with    Physical     Pt would like to discuss family history of colon cancer; would like refill of epiduo          11/21/2023     8:39 AM   Additional Questions   Roomed by Randa RUIZ     Screenings as of today     PHQ-2 Total Score (Adult) - Positive if 3 or more points; Administer   PHQ-9 if positive 0      Randa Hathaway, DAVIAN at 8:46 AM on 11/21/2023

## 2023-11-21 NOTE — PATIENT INSTRUCTIONS
Jason Pugh,    It was a pleasure seeing you in the clinic today. We discussed multiple things:    Try checking your BP three times per week  I ordered your adapalene refill  I ordered FIT testing    Please do not hesitate to reach out to me or schedule an appointment if you have any questions or concerns.     All the best,    Dr. Saldaña

## 2024-11-13 ENCOUNTER — VIRTUAL VISIT (OUTPATIENT)
Dept: UROLOGY | Facility: CLINIC | Age: 34
End: 2024-11-13
Payer: COMMERCIAL

## 2024-11-13 DIAGNOSIS — Z30.2 ENCOUNTER FOR VASECTOMY: Primary | ICD-10-CM

## 2024-11-13 PROCEDURE — 99203 OFFICE O/P NEW LOW 30 MIN: CPT | Mod: 95 | Performed by: STUDENT IN AN ORGANIZED HEALTH CARE EDUCATION/TRAINING PROGRAM

## 2024-11-13 NOTE — LETTER
11/13/2024       RE: Ion Dumont  [address]  [place] [address]     Dear Colleague,    Thank you for referring your patient, Ion Dumont, to the [facility] at [facility]. Please see a copy of my visit note below.    VASECTOMY CONSULTATION NOTE  DATE OF VISIT: 11/13/2024  PATIENT NAME: Ion Dumont    YOB: 1990      REASON FOR CONSULTATION: Mr. Ion Dumont is a 34 year old year old gentleman who is seen today requesting a vasectomy as a form of permanent birth control.     Two girls, 5 and 2. 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 Does not feel his scrotum is tight to the body.    PAST MEDICAL HISTORY: No past medical history on file.    PAST SURGICAL HISTORY: No past surgical history on file.    MEDICATIONS:   Current Outpatient Medications:      Multiple Vitamins-Minerals (MULTIVITAMIN ADULT) CHEW, , Disp: , Rfl:      adapalene-benzoyl peroxide (EPIDUO) 0.1-2.5 % gel, Apply topically daily, Disp: 45 g, Rfl: 0    ALLERGIES: No Known Allergies    FAMILY HISTORY: No family history on file.    SOCIAL HISTORY:   Social History     Socioeconomic History     Marital status:      Spouse name: Not on file     Number of children: Not on file     Years of education: Not on file     Highest education level: Not on file   Occupational History     Not on file   Tobacco Use     Smoking status: Never     Smokeless tobacco: Never   Substance and Sexual Activity     Alcohol use: Not on file     Drug use: Not on file     Sexual activity: Not on file   Other Topics Concern     Not on file   Social History Narrative     Not on file     Social Drivers of Health     Financial Resource Strain: Low Risk  (11/21/2023)    Financial Resource Strain      Within the past 12 months, have you or your family  members you live with been unable to get utilities (heat, electricity) when it was really needed?: No   Food Insecurity: Low Risk  (11/21/2023)    Food Insecurity      Within the past 12 months, did you worry that your food would run out before you got money to buy more?: No      Within the past 12 months, did the food you bought just not last and you didn t have money to get more?: No   Transportation Needs: Low Risk  (11/21/2023)    Transportation Needs      Within the past 12 months, has lack of transportation kept you from medical appointments, getting your medicines, non-medical meetings or appointments, work, or from getting things that you need?: No   Physical Activity: Not on file   Stress: Not on file   Social Connections: Unknown (1/1/2022)    Received from [facility]    Social Connections      Frequency of Communication with Friends and Family: Not on file   Interpersonal Safety: Low Risk  (11/21/2023)    Interpersonal Safety      Do you feel physically and emotionally safe where you currently live?: Yes      Within the past 12 months, have you been hit, slapped, kicked or otherwise physically hurt by someone?: No      Within the past 12 months, have you been humiliated or emotionally abused in other ways by your partner or ex-partner?: No   Housing Stability: Low Risk  (11/21/2023)    Housing Stability      Do you have housing? : Yes      Are you worried about losing your housing?: No       Due to the nature of this video visit, no physical exam was performed.     DIAGNOSIS: Request for sterilization    PLAN: The risks of the procedure as well as expectations for recovery and outcomes were explained in detail to him.  He was counseled on the risks for bleeding infection and pain after the procedure. We discussed the risk of post-vasectomy pain syndrome.  He was instructed to continue to use contraception until he had proven  azoospermia on a semen specimen.  This would normally be collected at least 3 months after the procedure. Also discussed the rare, but possible risk of re-canalization of the vas, even after successful vasectomy with sterile semen specimen.  He was instructed to hold all anticoagulants medications for one week prior to the procedure.  It was recommended that he have someone else drive him home after his vasectomy.  In light of these risks and expectations he would like to proceed.  We are scheduling a vasectomy in the office in the near future.    Pt. Understands:  -1/1000-1/3000 risk of future pregnancy even with perfectly done vasectomy  -vasectomy is a permanent procedure    -he may cryopreserve sperm if he wishes   -1-5% risk of post-vasectomy pain syndrome   -1-5% risk of complication, primarily infection or bleeding  - he needs to have a semen sample that shows no sperm before getting approval for unprotected intercourse.      Thank you for the kind consultation.    Time spent: 15 minutes of which >50% was spent counseling.    Video-Visit Details  The patient consents to today's video visit: yes    Type of service:  Video Visit    Video Start Time:  9:25 AM    Video End Time:9:40 AM    Originating Location (pt. Location): Home    Distant Location (provider location):  [facility]    Platform used for Video Visit: [facility]    Hannah Brown MD   Urology  [facility]  Clinic Phone [phone]      Again, thank you for allowing me to participate in the care of your patient.      Sincerely,    Hannah Brown MD

## 2024-11-13 NOTE — PROGRESS NOTES
"Virtual Visit Details    Type of service:  Video Visit     Originating Location (pt. Location): {video visit patient location:514944::\"Home\"}  {PROVIDER LOCATION On-site should be selected for visits conducted from your clinic location or adjoining [facility] hospital, academic office, or other nearby [facility] building. Off-site should be selected for all other provider locations, including home:[id number]}  Distant Location (provider location):  {virtual location provider:487212}  Platform used for Video Visit: {Virtual Visit Platforms:661508::\"Wipster\"}  "

## 2024-11-13 NOTE — PROGRESS NOTES
VASECTOMY CONSULTATION NOTE  DATE OF VISIT: 11/13/2024  PATIENT NAME: Ion Dumont    YOB: 1990      REASON FOR CONSULTATION: Mr. Ion Dumont is a 34 year old year old gentleman who is seen today requesting a vasectomy as a form of permanent birth control.     Two girls, 5 and 2. 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 Does not feel his scrotum is tight to the body.    PAST MEDICAL HISTORY: No past medical history on file.    PAST SURGICAL HISTORY: No past surgical history on file.    MEDICATIONS:   Current Outpatient Medications:     Multiple Vitamins-Minerals (MULTIVITAMIN ADULT) CHEW, , Disp: , Rfl:     adapalene-benzoyl peroxide (EPIDUO) 0.1-2.5 % gel, Apply topically daily, Disp: 45 g, Rfl: 0    ALLERGIES: No Known Allergies    FAMILY HISTORY: No family history on file.    SOCIAL HISTORY:   Social History     Socioeconomic History    Marital status:      Spouse name: Not on file    Number of children: Not on file    Years of education: Not on file    Highest education level: Not on file   Occupational History    Not on file   Tobacco Use    Smoking status: Never    Smokeless tobacco: Never   Substance and Sexual Activity    Alcohol use: Not on file    Drug use: Not on file    Sexual activity: Not on file   Other Topics Concern    Not on file   Social History Narrative    Not on file     Social Drivers of Health     Financial Resource Strain: Low Risk  (11/21/2023)    Financial Resource Strain     Within the past 12 months, have you or your family members you live with been unable to get utilities (heat, electricity) when it was really needed?: No   Food Insecurity: Low Risk  (11/21/2023)    Food Insecurity     Within the past 12 months, did you worry that your food would run out before you got money to buy more?: No     Within the past 12 months, did the food you  bought just not last and you didn t have money to get more?: No   Transportation Needs: Low Risk  (11/21/2023)    Transportation Needs     Within the past 12 months, has lack of transportation kept you from medical appointments, getting your medicines, non-medical meetings or appointments, work, or from getting things that you need?: No   Physical Activity: Not on file   Stress: Not on file   Social Connections: Unknown (1/1/2022)    [facility]    Social Connections     Frequency of Communication with Friends and Family: Not on file   Interpersonal Safety: Low Risk  (11/21/2023)    Interpersonal Safety     Do you feel physically and emotionally safe where you currently live?: Yes     Within the past 12 months, have you been hit, slapped, kicked or otherwise physically hurt by someone?: No     Within the past 12 months, have you been humiliated or emotionally abused in other ways by your partner or ex-partner?: No   Housing Stability: Low Risk  (11/21/2023)    Housing Stability     Do you have housing? : Yes     Are you worried about losing your housing?: No       Due to the nature of this video visit, no physical exam was performed.     DIAGNOSIS: Request for sterilization    PLAN: The risks of the procedure as well as expectations for recovery and outcomes were explained in detail to him.  He was counseled on the risks for bleeding infection and pain after the procedure. We discussed the risk of post-vasectomy pain syndrome.  He was instructed to continue to use contraception until he had proven azoospermia on a semen specimen.  This would normally be collected at least 3 months after the procedure. Also discussed the rare, but possible risk of re-canalization of the vas, even after successful vasectomy with sterile semen specimen.  He was instructed to hold all anticoagulants medications for one week prior to the  procedure.  It was recommended that he have someone else drive him home after his vasectomy.  In light of these risks and expectations he would like to proceed.  We are scheduling a vasectomy in the office in the near future.    Pt. Understands:  -1/1000-1/3000 risk of future pregnancy even with perfectly done vasectomy  -vasectomy is a permanent procedure    -he may cryopreserve sperm if he wishes   -1-5% risk of post-vasectomy pain syndrome   -1-5% risk of complication, primarily infection or bleeding  - he needs to have a semen sample that shows no sperm before getting approval for unprotected intercourse.      Thank you for the kind consultation.    Time spent: 15 minutes of which >50% was spent counseling.    Video-Visit Details  The patient consents to today's video visit: yes    Type of service:  Video Visit    Video Start Time:  9:25 AM    Video End Time:9:40 AM    Originating Location (pt. Location): Home    Distant Location (provider location):  [facility]    Platform used for Video Visit: Alex Brown MD   Urology  [facility]  Clinic Phone [phone]

## 2024-11-13 NOTE — NURSING NOTE
Current patient location: [facility] [address] [place] [address]    Is the patient currently in the state of [place]? YES    Visit mode:VIDEO    If the visit is dropped, the patient can be reconnected by:VIDEO VISIT: Text to cell phone:   Telephone Information:   Mobile [phone]       Will anyone else be joining the visit? NO  (If patient encounters technical issues they should call [phone]    Are changes needed to the allergy or medication list? No and Pt stated no med changes    Are refills needed on medications prescribed by this physician? NO    Rooming Documentation:  Not applicable    Reason for visit: Consult    Krystina ADAMS

## 2024-12-21 ENCOUNTER — HEALTH MAINTENANCE LETTER (OUTPATIENT)
Age: 34
End: 2024-12-21

## 2024-12-30 ENCOUNTER — OFFICE VISIT (OUTPATIENT)
Dept: UROLOGY | Facility: CLINIC | Age: 34
End: 2024-12-30
Payer: COMMERCIAL

## 2024-12-30 VITALS — SYSTOLIC BLOOD PRESSURE: 132 MMHG | OXYGEN SATURATION: 98 % | DIASTOLIC BLOOD PRESSURE: 84 MMHG | HEART RATE: 88 BPM

## 2024-12-30 DIAGNOSIS — Z30.2 ENCOUNTER FOR VASECTOMY: Primary | ICD-10-CM

## 2024-12-30 DIAGNOSIS — Z30.2 ENCOUNTER FOR STERILIZATION: ICD-10-CM

## 2024-12-30 PROCEDURE — 55250 REMOVAL OF SPERM DUCT(S): CPT | Performed by: STUDENT IN AN ORGANIZED HEALTH CARE EDUCATION/TRAINING PROGRAM

## 2024-12-30 RX ORDER — LIDOCAINE HYDROCHLORIDE 10 MG/ML
20 INJECTION, SOLUTION EPIDURAL; INFILTRATION; INTRACAUDAL; PERINEURAL ONCE
Status: COMPLETED | OUTPATIENT
Start: 2024-12-30 | End: 2024-12-30

## 2024-12-30 RX ADMIN — LIDOCAINE HYDROCHLORIDE 20 ML: 10 INJECTION, SOLUTION EPIDURAL; INFILTRATION; INTRACAUDAL; PERINEURAL at 09:40

## 2024-12-30 NOTE — NURSING NOTE
The following medication was given:     MEDICATION:  Lidocaine 1% Soln  ROUTE:  infiltration  SITE: R and L vas deferens  DOSE: 200mg/20mL  LOT #: [id number]  : Sofia  EXPIRATION DATE: 2026-jan-31  NDC#:  4710-6232-31    Was there drug waste? Yes  Amount of drug waste (mL): 7.  Reason for waste:  Single use vial  Multi-dose vial: No    NBA Baca CMA

## 2024-12-30 NOTE — PATIENT INSTRUCTIONS
POST VASECTOMY INSTRUCTIONS    1.) If you have any concerns or questions, please contact our office at [phone] during office hours. If it is after hours, please call [phone].     2.) It is okay to take a shower, however, do not soak in water (bath,swimming, hot tub,etc....) until your incision is healed.    3.) You might notice some swelling, mild bruising, and discomfort for several days after your vasectomy. This is to be expected. For at least the next 24 hours, an ice pack should be applied for 20 minutes every hour that you are awake. Ice will help with discomfort and swelling. Do not place directly on the skin.    4.) No intercourse, strenuous activity or exercise for at least 7-10 days, even if you feel fine.    5.) You need to wear good scrotal support while you are healing. We strongly recommend an athletic supporter or a pair of regular briefs that are one size too small. Boxer briefs do not offer enough support.    6.) Use Motrin, Tylenol or Ibuprofen as directed on the bottle for discomfort.     7.) It is normal to have mild drainage from the incision area for several days. However, please contact our office if you notice: bright red blood that does not stop after three days, increased pain, heat at the incision, red streaks, foul smelling discharge, or if you start to run a fever.     8.) YOU MUST CONTINUE BIRTH CONTROL UNTIL WE CONFIRM YOUR STERILITY.  This process can take up to a year to complete (rare occurrence).     9.) You have been given a form with specimen cup and instructions for your follow up specimen. You will be cleared once we receive ONE negative specimen. If your specimen comes back positive (sperm seen) you will be asked to repeat the test. This does not mean that your vasectomy has failed.

## 2024-12-30 NOTE — PROGRESS NOTES
OFFICE VASECTOMY OPERATIVE NOTE  [facility]    DATE: 12/30/24  PATIENT: Ion Dumont    YOB: 1990    Ion Dumont is a 34 year old male.  He has 2 children and he wishes a vasectomy for birth control.  He has read the brochure and he has shaved himself.  I reviewed the vasectomy procedure with him explaining that it would be done with a local anesthetic given just in the location where the vasectomy would be done.  It would be done through incisions with the removal of segments of the vasa, cauterization of the ends, and burying the ends separate with sutures.      Pt. Understands:  -1/1000-1/3000 risk of future pregnancy even with perfectly done vasectomy  -vasectomy is a permanent procedure    -he may cryopreserve sperm if he wishes   -1-5% risk of post-vasectomy pain syndrome   -1-5% risk of complication, primarily infection or bleeding  - he needs to have a semen sample that shows no sperm before getting approval for unprotected intercourse.      Complications such as bleeding, infection, and damage to other tissues in the area were discussed. I recommended that an ice bag be placed on the scrotum off and on tonight to help reduce pain and swelling.      He was reminded that he was not sterile immediately after the vasectomy that it would take at least 20 ejaculations to empty the vas of any remaining sperm.  He was not to provide a semen sample until after the 20th ejaculation and not before 12 weeks after the vas. He was  to fulfill both of those requirements.   He understands it is his responsibility to find out the results of the vas before proceeding with intercourse without birth control protection.  Other items discussed were activity afterwards, returning to work, voluntary physical activity,  resuming sexual activity, clothing to wear, bathing, and care of the vas site and expected changes in the site as healing progresses.  After signing the permit, bilateral vasectomy  was done as described below.     ANESTHESIA: Local    DETAILS OF PROCEDURE: The risks of the procedure were explained in detail to the patient and informed consent was obtained. The patient was placed supine on the procedure table and the penis and scrotum were prepped and draped in the standard sterile fashion. The right vas deferens was isolated and brought up to the skin. 1% lidocaine local anesthesia was used to infiltrate the skin and the spermatic cord. A small incision was created and adventitial tissues were swept away from the vas. A 1 cm segment of the vas was excised and sent for pathology. The proximal and distal lumina of the vas were cauterized and then each segment was tied off in a knuckling-fashion with a 3-0 vicryl suture. Hemostasis was ensured and the segments were released back into the scrotum. Meticulous hemostasis was achieved. The skin was closed with 3-0 chromic suture in a horizontal mattress fashion. Next the left vas was brought to the skin and a vasectomy was performed in the similar fashion. The skin was closed with 3-0 chromic suture in a horizontal mattress fashion.    COMPLICATIONS: None    TAKE HOME MEDICATIONS: Tylenol and Ibuprofen every 3 hours alternating, PRN    DISMISSAL INSTRUCTIONS:  - Ice pack to scrotum 15 to 20 minutes each hour awake for 36 to 40 hours.  - No strenuous activity or ejaculation for at least 7 days.  - No unprotected sexual activity until proven azoospermia on semen samples at 3 months.  - Referred to patient handout for normal postop expectations and indications to contact nurse or physician.    M.D.: Hannah Brown MD

## 2024-12-30 NOTE — NURSING NOTE
Pt has signed the consent form today confirming that a VASECTOMY is the correct procedure today. I verbally confirmed the patient s identity using two indicators, that the pt has started any medication as prescribed for this procedure, relevant allergies, that they have not used blood thinning products in the last 7 - 10 days, and that the correct equipment was available. Immediately prior to starting the procedure I conducted the Time Out with the MD and re-confirmed the patient s name and procedure. Pathology ordered and sent to lab. Post-procedure information, also specimen collection cup with instructions, given to pt at time of check out.    NBA Baca CMA

## 2024-12-30 NOTE — LETTER
12/30/2024       RE: Ion Dumont  [address]  [place] [address]     Dear Colleague,    Thank you for referring your patient, oIn Dumont, to the [facility] [place] at [facility]. Please see a copy of my visit note below.    OFFICE VASECTOMY OPERATIVE NOTE  [facility]    DATE: 12/30/24  PATIENT: Ion Dumont    YOB: 1990    Ion Dumont is a 34 year old male.  He has 2 children and he wishes a vasectomy for birth control.  He has read the brochure and he has shaved himself.  I reviewed the vasectomy procedure with him explaining that it would be done with a local anesthetic given just in the location where the vasectomy would be done.  It would be done through incisions with the removal of segments of the vasa, cauterization of the ends, and burying the ends separate with sutures.      Pt. Understands:  -1/1000-1/3000 risk of future pregnancy even with perfectly done vasectomy  -vasectomy is a permanent procedure    -he may cryopreserve sperm if he wishes   -1-5% risk of post-vasectomy pain syndrome   -1-5% risk of complication, primarily infection or bleeding  - he needs to have a semen sample that shows no sperm before getting approval for unprotected intercourse.      Complications such as bleeding, infection, and damage to other tissues in the area were discussed. I recommended that an ice bag be placed on the scrotum off and on tonight to help reduce pain and swelling.      He was reminded that he was not sterile immediately after the vasectomy that it would take at least 20 ejaculations to empty the vas of any remaining sperm.  He was not to provide a semen sample until after the 20th ejaculation and not before 12 weeks after the vas. He was  to fulfill both of those requirements.   He understands it is his responsibility to find out the results of the vas before proceeding with intercourse  without birth control protection.  Other items discussed were activity afterwards, returning to work, voluntary physical activity,  resuming sexual activity, clothing to wear, bathing, and care of the vas site and expected changes in the site as healing progresses.  After signing the permit, bilateral vasectomy was done as described below.     ANESTHESIA: Local    DETAILS OF PROCEDURE: The risks of the procedure were explained in detail to the patient and informed consent was obtained. The patient was placed supine on the procedure table and the penis and scrotum were prepped and draped in the standard sterile fashion. The right vas deferens was isolated and brought up to the skin. 1% lidocaine local anesthesia was used to infiltrate the skin and the spermatic cord. A small incision was created and adventitial tissues were swept away from the vas. A 1 cm segment of the vas was excised and sent for pathology. The proximal and distal lumina of the vas were cauterized and then each segment was tied off in a knuckling-fashion with a 3-0 vicryl suture. Hemostasis was ensured and the segments were released back into the scrotum. Meticulous hemostasis was achieved. The skin was closed with 3-0 chromic suture in a horizontal mattress fashion. Next the left vas was brought to the skin and a vasectomy was performed in the similar fashion. The skin was closed with 3-0 chromic suture in a horizontal mattress fashion.    COMPLICATIONS: None    TAKE HOME MEDICATIONS: Tylenol and Ibuprofen every 3 hours alternating, PRN    DISMISSAL INSTRUCTIONS:  - Ice pack to scrotum 15 to 20 minutes each hour awake for 36 to 40 hours.  - No strenuous activity or ejaculation for at least 7 days.  - No unprotected sexual activity until proven azoospermia on semen samples at 3 months.  - Referred to patient handout for normal postop expectations and indications to contact nurse or physician.    M.D.: Hannah Brown MD        Again, thank you for  allowing me to participate in the care of your patient.      Sincerely,    Hannah Brown MD

## 2024-12-31 LAB
PATH REPORT.COMMENTS IMP SPEC: NORMAL
PATH REPORT.COMMENTS IMP SPEC: NORMAL
PATH REPORT.FINAL DX SPEC: NORMAL
PATH REPORT.GROSS SPEC: NORMAL
PATH REPORT.MICROSCOPIC SPEC OTHER STN: NORMAL
PATH REPORT.RELEVANT HX SPEC: NORMAL
PHOTO IMAGE: NORMAL

## 2025-06-05 ENCOUNTER — OFFICE VISIT (OUTPATIENT)
Dept: URGENT CARE | Facility: URGENT CARE | Age: 35
End: 2025-06-05
Payer: COMMERCIAL

## 2025-06-05 VITALS
HEIGHT: 72 IN | DIASTOLIC BLOOD PRESSURE: 98 MMHG | WEIGHT: 218 LBS | OXYGEN SATURATION: 97 % | TEMPERATURE: 98 F | HEART RATE: 69 BPM | RESPIRATION RATE: 16 BRPM | BODY MASS INDEX: 29.53 KG/M2 | SYSTOLIC BLOOD PRESSURE: 149 MMHG

## 2025-06-05 DIAGNOSIS — H61.23 BILATERAL IMPACTED CERUMEN: Primary | ICD-10-CM

## 2025-06-05 NOTE — PROGRESS NOTES
Assessment & Plan     (H61.23) Bilateral impacted cerumen  (primary encounter diagnosis)  MA cleaned ears with water to remove cerumen.  Patient tolerated procedure well.  There were no complications.  Advised debrox as needed in future           No follow-ups on file.    Frandy Reyna MD  [facility]    Subjective     Ion Dumont is a 35 year old year old male who presents to clinic today for the following health issues:    Patient presents with:  Ear Problem: Hearing problem , ringing    This is a 36 yo male who presents with fullness in bilateral ears.  No pain, congestion, fever, sore throat.  Otherwise feels well.    Patient Active Problem List   Diagnosis    Encounter for vasectomy       Current Outpatient Medications   Medication Sig Dispense Refill    adapalene-benzoyl peroxide (EPIDUO) 0.1-2.5 % gel Apply topically daily 45 g 0    Multiple Vitamins-Minerals (MULTIVITAMIN ADULT) CHEW        No current facility-administered medications for this visit.       No past medical history on file.    Social History   reports that he has never smoked. He has never used smokeless tobacco.    No family history on file.    Review of Systems  Constitutional, HEENT, cardiovascular, pulmonary, GI, , musculoskeletal, neuro, skin, endocrine and psych systems are negative, except as otherwise noted.      Objective    BP (!) 149/98   Pulse 69   Temp 98  F (36.7  C)   Resp 16   Ht 1.829 m (6')   Wt 98.9 kg (218 lb)   SpO2 97%   BMI 29.57 kg/m    Physical Exam   GENERAL: alert and no distress  EYES: Eyes grossly normal to inspection, PERRL and conjunctivae and sclerae normal  HENT: b ear canals with cerumen and, once clear, b external canal and TM's normal, nose and mouth without ulcers or lesions  NECK: no adenopathy, no asymmetry, masses, or scars  RESP: lungs clear to auscultation - no rales, rhonchi or wheezes  CV: regular rate and rhythm, normal S1 S2, no S3 or S4, no  murmur, click or rub, no peripheral edema  ABDOMEN: soft, nontender, no hepatosplenomegaly, no masses and bowel sounds normal  MS: no gross musculoskeletal defects noted, no edema  SKIN: no suspicious lesions or rashes  NEURO: Normal strength and tone, mentation intact and speech normal  PSYCH: mentation appears normal, affect normal/bright

## 2025-06-05 NOTE — PROGRESS NOTES
Urgent Care Clinic Visit    Chief Complaint   Patient presents with    Ear Problem     Hearing problem , ringing